# Patient Record
Sex: MALE | Race: BLACK OR AFRICAN AMERICAN | NOT HISPANIC OR LATINO | Employment: FULL TIME | URBAN - METROPOLITAN AREA
[De-identification: names, ages, dates, MRNs, and addresses within clinical notes are randomized per-mention and may not be internally consistent; named-entity substitution may affect disease eponyms.]

---

## 2022-11-14 ENCOUNTER — OFFICE VISIT (OUTPATIENT)
Dept: DERMATOLOGY | Facility: CLINIC | Age: 47
End: 2022-11-14
Payer: COMMERCIAL

## 2022-11-14 DIAGNOSIS — D48.5 NEOPLASM OF UNCERTAIN BEHAVIOR OF SKIN: Primary | ICD-10-CM

## 2022-11-14 PROCEDURE — 99203 OFFICE O/P NEW LOW 30 MIN: CPT | Mod: 25,S$GLB,, | Performed by: DERMATOLOGY

## 2022-11-14 PROCEDURE — 99203 PR OFFICE/OUTPT VISIT, NEW, LEVL III, 30-44 MIN: ICD-10-PCS | Mod: 25,S$GLB,, | Performed by: DERMATOLOGY

## 2022-11-14 PROCEDURE — 11102 PR TANGENTIAL BIOPSY, SKIN, SINGLE LESION: ICD-10-PCS | Mod: S$GLB,,, | Performed by: DERMATOLOGY

## 2022-11-14 PROCEDURE — 1159F MED LIST DOCD IN RCRD: CPT | Mod: CPTII,S$GLB,, | Performed by: DERMATOLOGY

## 2022-11-14 PROCEDURE — 99999 PR PBB SHADOW E&M-NEW PATIENT-LVL III: CPT | Mod: PBBFAC,,, | Performed by: DERMATOLOGY

## 2022-11-14 PROCEDURE — 11102 TANGNTL BX SKIN SINGLE LES: CPT | Mod: S$GLB,,, | Performed by: DERMATOLOGY

## 2022-11-14 PROCEDURE — 1160F RVW MEDS BY RX/DR IN RCRD: CPT | Mod: CPTII,S$GLB,, | Performed by: DERMATOLOGY

## 2022-11-14 PROCEDURE — 99999 PR PBB SHADOW E&M-NEW PATIENT-LVL III: ICD-10-PCS | Mod: PBBFAC,,, | Performed by: DERMATOLOGY

## 2022-11-14 PROCEDURE — 1160F PR REVIEW ALL MEDS BY PRESCRIBER/CLIN PHARMACIST DOCUMENTED: ICD-10-PCS | Mod: CPTII,S$GLB,, | Performed by: DERMATOLOGY

## 2022-11-14 PROCEDURE — 1159F PR MEDICATION LIST DOCUMENTED IN MEDICAL RECORD: ICD-10-PCS | Mod: CPTII,S$GLB,, | Performed by: DERMATOLOGY

## 2022-11-14 RX ORDER — METFORMIN HYDROCHLORIDE 500 MG/1
500 TABLET ORAL 2 TIMES DAILY WITH MEALS
COMMUNITY

## 2022-11-14 RX ORDER — LISINOPRIL 2.5 MG/1
2.5 TABLET ORAL DAILY
COMMUNITY

## 2022-11-14 NOTE — PROGRESS NOTES
Subjective:       Patient ID:  Franco Kapoor is a 46 y.o. male who presents for   Chief Complaint   Patient presents with    Warts     History of Present Illness: The patient presents with chief complaint of lesion.  Location: right 2nd digit  Duration: several years  Signs/Symptoms: growing    Prior treatments: n/a     Review of Systems   Constitutional:  Negative for fever and chills.   Gastrointestinal:  Negative for nausea and vomiting.   Skin:  Positive for activity-related sunscreen use. Negative for daily sunscreen use and recent sunburn.   Hematologic/Lymphatic: Does not bruise/bleed easily.      Objective:    Physical Exam   Constitutional: He appears well-developed and well-nourished. No distress.   Neurological: He is alert and oriented to person, place, and time. He is not disoriented.   Psychiatric: He has a normal mood and affect.   Skin:   Areas Examined (abnormalities noted in diagram):   Head / Face Inspection Performed  Neck Inspection Performed  RUE Inspected  LUE Inspection Performed  Nails and Digits Inspection Performed            Assessment / Plan:      Pathology Orders:       Normal Orders This Visit    Specimen to Pathology, Dermatology     Questions:    Procedure Type: Dermatology and skin neoplasms    Number of Specimens: 1    ------------------------: -------------------------    Spec 1 Procedure: Biopsy    Spec 1 Clinical Impression: slow growing tumor of the right 2nd digit, biopsy of larger growth/lesion performed    Spec 1 Source: right 2nd digit    Clinical Information: see above    Release to patient: Immediate          Neoplasm of uncertain behavior of skin  -     Ambulatory referral/consult to Orthopedics; Future; Expected date: 11/21/2022  -     Specimen to Pathology, Dermatology  -     tumor of the right 2nd digit, will refer to Dr. Peterson for further treatment.  Will perform shave biopsy today for potential diagnosis.   -     Shave biopsy(-ies) done of 1 site(s).   Patient  informed to call for results within 2 weeks if have not [facility]             Follow up for f/u with hand surgeon, Dr. Peterson.    PROCEDURE NOTE - SHAVE BIOPSY   Location: see above    After risk, benefits, and alternatives were discussed with the patient, the patient agrees to the procedure by verbal informed consent.  The area(s) were cleansed with alcohol. 2 cc of lidocaine 1% with epinephrine was injected for local anesthesia into each lesion(s).  A sharp dermablade was used to remove part or all of the lesion(s).  The specimen(s) will be sent for tissue pathology.  Hemostasis was obtained with aluminum chloride and/or hyfrecation.  The area(s) were dressed with vaseline ointment and bandaged.  The patient tolerated the procedure well without adverse events.  Wound care instructions were given to the patient on the AVS.  The patient will be notified of pathology results once available. Results will also be available in Epic.

## 2022-11-14 NOTE — PATIENT INSTRUCTIONS
Shave Biopsy Wound Care    Your doctor has performed a shave biopsy today.  A band aid and vaseline ointment has been placed over the site.  This should remain in place for 24 hours.  It is recommended that you keep the area dry for the first 24 hours.  After 24 hours, you may remove the band aid and wash the area with warm soap and water and apply Vaseline jelly.  Many patients prefer to use Neosporin or Bacitracin ointment.  This is acceptable; however, know that you can develop an allergy to this medication even if you have used it safely for years.  It is important to keep the area moist.  Letting it dry out and get air slows healing time, and will worsen the scar.  Band aid is optional after first 24 hours.      If you notice increasing redness, tenderness, pain, or yellow drainage at the biopsy site, please notify your doctor.  These are signs of an infection.    If your biopsy site is bleeding, apply firm pressure for 15 minutes straight.  Repeat for another 15 minutes, if it is still bleeding.   If the surgical site continues to bleed, then please contact your doctor.      [facility] - [facility]   DERMATOLOGY  [address]   [place] [address]   Dept: [phone]   Dept Fax: [phone]

## 2022-11-23 ENCOUNTER — OFFICE VISIT (OUTPATIENT)
Dept: ORTHOPEDICS | Facility: CLINIC | Age: 47
End: 2022-11-23
Payer: COMMERCIAL

## 2022-11-23 VITALS — BODY MASS INDEX: 35.55 KG/M2 | WEIGHT: 240 LBS | HEIGHT: 69 IN

## 2022-11-23 DIAGNOSIS — D48.5 NEOPLASM OF UNCERTAIN BEHAVIOR OF SKIN: ICD-10-CM

## 2022-11-23 DIAGNOSIS — D48.19 GIANT CELL TUMOR OF TENDON SHEATH: Primary | ICD-10-CM

## 2022-11-23 PROCEDURE — 4010F ACE/ARB THERAPY RXD/TAKEN: CPT | Mod: CPTII,S$GLB,,

## 2022-11-23 PROCEDURE — 99203 OFFICE O/P NEW LOW 30 MIN: CPT | Mod: S$GLB,,,

## 2022-11-23 PROCEDURE — 1159F PR MEDICATION LIST DOCUMENTED IN MEDICAL RECORD: ICD-10-PCS | Mod: CPTII,S$GLB,,

## 2022-11-23 PROCEDURE — 4010F PR ACE/ARB THEARPY RXD/TAKEN: ICD-10-PCS | Mod: CPTII,S$GLB,,

## 2022-11-23 PROCEDURE — 1159F MED LIST DOCD IN RCRD: CPT | Mod: CPTII,S$GLB,,

## 2022-11-23 PROCEDURE — 3008F PR BODY MASS INDEX (BMI) DOCUMENTED: ICD-10-PCS | Mod: CPTII,S$GLB,,

## 2022-11-23 PROCEDURE — 99999 PR PBB SHADOW E&M-EST. PATIENT-LVL III: ICD-10-PCS | Mod: PBBFAC,,,

## 2022-11-23 PROCEDURE — 99203 PR OFFICE/OUTPT VISIT, NEW, LEVL III, 30-44 MIN: ICD-10-PCS | Mod: S$GLB,,,

## 2022-11-23 PROCEDURE — 99999 PR PBB SHADOW E&M-EST. PATIENT-LVL III: CPT | Mod: PBBFAC,,,

## 2022-11-23 PROCEDURE — 3008F BODY MASS INDEX DOCD: CPT | Mod: CPTII,S$GLB,,

## 2022-11-23 NOTE — PROGRESS NOTES
"  SUBJECTIVE:      Chief Complaint: Pain of the Right Hand    Referring Provider: Yuli Lopez MD     History of Present Illness:  Patient is a 46 y.o. male who presents today with complaints of right index finger swelling.   Onset of symptoms was about 1 year ago. Denies injury or trauma to area.  The area is not painful.  He has not noticed any changes in size.  He is mostly concerned about the appearance of the finger.  He had a lesion biopsied on the same finger about a week ago by dermatology.  Denies discoloration, denies cold intolerance.  The patient denies any fevers, chills, N/V, D/C and presents for evaluation.    History reviewed. No pertinent past medical history.  History reviewed. No pertinent surgical history.  Review of patient's allergies indicates:  No Known Allergies  Social History     Social History Narrative    Not on file     History reviewed. No pertinent family history.      Current Outpatient Medications:     lisinopriL (PRINIVIL,ZESTRIL) 2.5 MG tablet, Take 2.5 mg by mouth once daily., Disp: , Rfl:     metFORMIN (GLUCOPHAGE) 500 MG tablet, Take 500 mg by mouth 2 (two) times daily with meals., Disp: , Rfl:       Review of Systems:  Constitutional: Negative for chills and fever.   Respiratory: Negative for cough and shortness of breath.    Gastrointestinal: Negative for nausea and vomiting.   Skin: Negative for rash.   Neurological: Negative for dizziness and headaches.   Psychiatric/Behavioral: Negative for depression.   MSK as in HPI     OBJECTIVE:      Vital Signs (Most Recent):  Vitals:    11/23/22 0747   Weight: 108.9 kg (240 lb)   Height: 5' 9" (1.753 m)     Body mass index is 35.44 kg/m².      Physical Exam:  Constitutional: The patient appears well-developed and well-nourished. No distress.   Skin: No lesions appreciated  Head: Normocephalic and atraumatic.   Nose: Nose normal.   Ears: No deformities seen  Eyes: Conjunctivae and EOM are normal.   Neck: No tracheal deviation " present.   Cardiovascular: Normal rate and intact distal pulses.    Pulmonary/Chest: Effort normal. No respiratory distress.   Abdominal: There is no guarding.   Neurological: The patient is alert.   Psychiatric: The patient has a normal mood and affect.                 Right Hand/Wrist Exam     Inspection   Scars: Wrist - absent Hand -  absent  Effusion: Wrist - absent Hand -  absent    Swelling   Hand - The patient is swollen on the index IP.    Other     Neuorologic Exam    Median Distribution: normal  Ulnar Distribution: normal  Radial Distribution: normal    Comments:  About 2cm subcutaneous soft tissue growth noted of the index finger at the mid and distal phalanx volar side, nontender to palpation  Scabbed area to distal phalanx of index finger where lesion was shaved off, healing appropriately  No sensory or motor deficits   No signs of infection   No discoloration      Left Hand/Wrist Exam     Inspection   Scars: Wrist - absent Hand -  absent  Effusion: Wrist - absent Hand -  absent    Other     Sensory Exam  Median Distribution: normal  Ulnar Distribution: normal  Radial Distribution: normal          Vascular Exam       Capillary Refill  Right Hand: normal capillary refill  Left Hand: normal capillary refill        Diagnostic Results:       No radiographs obtained today    ASSESSMENT/PLAN:        ICD-10-CM ICD-9-CM   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 Neoplasm of uncertain behavior of skin  D48.5 238.2        No orders of the defined types were placed in this encounter.      Plan:     -discussed diagnoses of giant cell tumor versus glomus tumor of finger, all questions answered  -x-ray of right hand ordered for follow-up appointment  -follow-up with Dr. Peterson for surgical evaluation next week    Should the patient's symptoms worsen, persist, or fail to improve they should return for reevaluation and I would be happy to see them back anytime.        Randi Seneca, PA-C [facility]     Please be aware that this note has been generated with the assistance of [employer] voice-to-text.  Please excuse any spelling or grammatical errors.

## 2022-11-30 ENCOUNTER — HOSPITAL ENCOUNTER (OUTPATIENT)
Dept: RADIOLOGY | Facility: HOSPITAL | Age: 47
Discharge: HOME | End: 2022-11-30
Payer: COMMERCIAL

## 2022-11-30 ENCOUNTER — OFFICE VISIT (OUTPATIENT)
Dept: ORTHOPEDICS | Facility: CLINIC | Age: 47
End: 2022-11-30
Payer: COMMERCIAL

## 2022-11-30 VITALS — HEIGHT: 69 IN | BODY MASS INDEX: 35.53 KG/M2 | WEIGHT: 239.88 LBS

## 2022-11-30 DIAGNOSIS — D48.19 GIANT CELL TUMOR OF TENDON SHEATH: ICD-10-CM

## 2022-11-30 DIAGNOSIS — D48.5 NEOPLASM OF UNCERTAIN BEHAVIOR OF SKIN: Primary | ICD-10-CM

## 2022-11-30 PROCEDURE — 99999 PR PBB SHADOW E&M-EST. PATIENT-LVL III: ICD-10-PCS | Mod: PBBFAC,,, | Performed by: ORTHOPAEDIC SURGERY

## 2022-11-30 PROCEDURE — 99999 PR PBB SHADOW E&M-EST. PATIENT-LVL III: CPT | Mod: PBBFAC,,, | Performed by: ORTHOPAEDIC SURGERY

## 2022-11-30 PROCEDURE — 99203 OFFICE O/P NEW LOW 30 MIN: CPT | Mod: S$GLB,,, | Performed by: ORTHOPAEDIC SURGERY

## 2022-11-30 PROCEDURE — 1160F PR REVIEW ALL MEDS BY PRESCRIBER/CLIN PHARMACIST DOCUMENTED: ICD-10-PCS | Mod: CPTII,S$GLB,, | Performed by: ORTHOPAEDIC SURGERY

## 2022-11-30 PROCEDURE — 4010F PR ACE/ARB THEARPY RXD/TAKEN: ICD-10-PCS | Mod: CPTII,S$GLB,, | Performed by: ORTHOPAEDIC SURGERY

## 2022-11-30 PROCEDURE — 1159F PR MEDICATION LIST DOCUMENTED IN MEDICAL RECORD: ICD-10-PCS | Mod: CPTII,S$GLB,, | Performed by: ORTHOPAEDIC SURGERY

## 2022-11-30 PROCEDURE — 73130 X-RAY EXAM OF HAND: CPT | Mod: 26,RT,, | Performed by: RADIOLOGY

## 2022-11-30 PROCEDURE — 1160F RVW MEDS BY RX/DR IN RCRD: CPT | Mod: CPTII,S$GLB,, | Performed by: ORTHOPAEDIC SURGERY

## 2022-11-30 PROCEDURE — 73130 XR HAND COMPLETE 3 VIEW RIGHT: ICD-10-PCS | Mod: 26,RT,, | Performed by: RADIOLOGY

## 2022-11-30 PROCEDURE — 3008F BODY MASS INDEX DOCD: CPT | Mod: CPTII,S$GLB,, | Performed by: ORTHOPAEDIC SURGERY

## 2022-11-30 PROCEDURE — 73130 X-RAY EXAM OF HAND: CPT | Mod: TC,RT

## 2022-11-30 PROCEDURE — 99203 PR OFFICE/OUTPT VISIT, NEW, LEVL III, 30-44 MIN: ICD-10-PCS | Mod: S$GLB,,, | Performed by: ORTHOPAEDIC SURGERY

## 2022-11-30 PROCEDURE — 3008F PR BODY MASS INDEX (BMI) DOCUMENTED: ICD-10-PCS | Mod: CPTII,S$GLB,, | Performed by: ORTHOPAEDIC SURGERY

## 2022-11-30 PROCEDURE — 4010F ACE/ARB THERAPY RXD/TAKEN: CPT | Mod: CPTII,S$GLB,, | Performed by: ORTHOPAEDIC SURGERY

## 2022-11-30 PROCEDURE — 1159F MED LIST DOCD IN RCRD: CPT | Mod: CPTII,S$GLB,, | Performed by: ORTHOPAEDIC SURGERY

## 2022-11-30 NOTE — PROGRESS NOTES
Subjective:     Patient ID: Franco Kapoor is a 46 y.o. male.    Chief Complaint: Swelling of the Right Hand      HPI:  The patient is a 46-year-old male with soft tissue mass of the volar pulp and volar middle phalanx right index finger present for many years.  He apparently had a skin biopsy by Dermatology.  He is referred from Dermatology for excision of this mass.  It is about 4 cm in vertical diameter.  Past Medical History:   Diagnosis Date    Pre-diabetes      History reviewed. No pertinent surgical history.  History reviewed. No pertinent family history.  Social History     Socioeconomic History    Marital status:    Tobacco Use    Smoking status: Never    Smokeless tobacco: Never   Substance and Sexual Activity    Alcohol use: Yes     Comment: socially    Drug use: Never    Sexual activity: Not Currently     Medication List with Changes/Refills   Current Medications    LISINOPRIL (PRINIVIL,ZESTRIL) 2.5 MG TABLET    Take 2.5 mg by mouth once daily.    METFORMIN (GLUCOPHAGE) 500 MG TABLET    Take 500 mg by mouth 2 (two) times daily with meals.     Review of patient's allergies indicates:  No Known Allergies  Review of Systems   Constitutional: Negative for malaise/fatigue.   HENT:  Negative for hearing loss.    Eyes:  Negative for double vision and visual disturbance.   Cardiovascular:  Negative for chest pain.   Respiratory:  Negative for shortness of breath.    Endocrine: Negative for cold intolerance.   Hematologic/Lymphatic: Does not bruise/bleed easily.   Skin:  Negative for poor wound healing and suspicious lesions.   Musculoskeletal:  Negative for gout, joint pain and joint swelling.   Gastrointestinal:  Negative for nausea and vomiting.   Genitourinary:  Negative for dysuria.   Neurological:  Negative for numbness, paresthesias and sensory change.   Psychiatric/Behavioral:  Negative for depression, memory loss and substance abuse. The patient is not nervous/anxious.    Allergic/Immunologic:  Negative for persistent infections.     Objective:   Body mass index is 35.42 kg/m².  There were no vitals filed for this visit.             General    Constitutional: He is oriented to person, place, and time. He appears well-developed and well-nourished. No distress.   HENT:   Head: Normocephalic.   Mouth/Throat: Oropharynx is clear and moist.   Eyes: EOM are normal.   Cardiovascular:  Normal rate.            Pulmonary/Chest: Effort normal.   Abdominal: Soft.   Neurological: He is alert and oriented to person, place, and time. No cranial nerve deficit.   Psychiatric: He has a normal mood and affect.             Right Hand/Wrist Exam     Inspection   Scars: Wrist - absent Hand -  absent  Effusion: Wrist - absent Hand -  absent    Other     Neuorologic Exam    Median Distribution: normal  Ulnar Distribution: normal  Radial Distribution: normal    Comments:  The patient is a 4 cm mass soft tissue pulp right index finger and volar middle phalanx.  There is a 5 mm defect in the skin from the dermatology biopsy.  The path report is not back yet          Vascular Exam       Capillary Refill  Right Hand: normal capillary refill        Relevant imaging results reviewed and interpreted by me, discussed with the patient and / or family today radiographs right index finger normal other than soft tissue mass  Assessment:     Encounter Diagnosis   Name Primary?    Neoplasm of uncertain behavior of skin Yes        Plan:     The patient was counseled regarding excision of this right index finger mass.  Risk complications and alternatives were discussed including the risk of infection, anesthetic risk, injury to nerves and vessels, loss of motion, and possible need for additional surgeries were discussed.  He seems to understand and agree that surgery.  All questions were answered.                Disclaimer: This note was prepared using a voice recognition system and is likely to have sound alike errors within the text.

## 2022-12-01 DIAGNOSIS — D48.19 GIANT CELL TUMOR OF TENDON SHEATH: Primary | ICD-10-CM

## 2022-12-01 DIAGNOSIS — Z01.818 PREOP TESTING: ICD-10-CM

## 2022-12-01 DIAGNOSIS — D48.5 NEOPLASM OF UNCERTAIN BEHAVIOR OF SKIN: ICD-10-CM

## 2022-12-14 LAB
COMMENT: NORMAL
FINAL PATHOLOGIC DIAGNOSIS: NORMAL
GROSS: NORMAL
Lab: NORMAL
MICROSCOPIC EXAM: NORMAL
SUPPLEMENTAL DIAGNOSIS: NORMAL

## 2023-01-09 ENCOUNTER — TELEPHONE (OUTPATIENT)
Dept: ORTHOPEDICS | Facility: CLINIC | Age: 48
End: 2023-01-09
Payer: COMMERCIAL

## 2023-01-09 PROBLEM — D48.9 NEOPLASM OF UNCERTAIN BEHAVIOR: Status: ACTIVE | Noted: 2023-01-09

## 2023-01-09 NOTE — TELEPHONE ENCOUNTER
Spoke to the patient an we rescheduled his surgery to march 13 an also moved his 2 post op appointments an called the OR an spoke to Nanci jay she pulled from the depot an put the surgery back on for march 13th.         ----- Message from Nieves Oneill NP sent at 1/9/2023  2:37 PM CST -----  Regarding: Patient wishes to postpone surgery until March  Good afternoon,    I just spoke with Mr. Kapoor as he did not come for his PAT appointment today.  He is requesting to please reschedule his surgery with Dr. Peterson for March.  I will place his current case request in the depot, if you can please reach out to the patient to reschedule at his convenience :)    Sincerely,    Electronically signed by Nieves Oneill DNP, ACNP on 1/9/2023 at 2:38 PM.

## 2023-03-08 ENCOUNTER — HOSPITAL ENCOUNTER (OUTPATIENT)
Dept: PREADMISSION TESTING | Facility: HOSPITAL | Age: 48
Discharge: HOME | End: 2023-03-08
Attending: ORTHOPAEDIC SURGERY
Payer: COMMERCIAL

## 2023-03-08 VITALS
HEART RATE: 89 BPM | WEIGHT: 239 LBS | BODY MASS INDEX: 35.4 KG/M2 | RESPIRATION RATE: 14 BRPM | OXYGEN SATURATION: 97 % | DIASTOLIC BLOOD PRESSURE: 67 MMHG | TEMPERATURE: 98 F | HEIGHT: 69 IN | SYSTOLIC BLOOD PRESSURE: 131 MMHG

## 2023-03-08 DIAGNOSIS — D48.9 NEOPLASM OF UNCERTAIN BEHAVIOR: ICD-10-CM

## 2023-03-08 DIAGNOSIS — E66.09 CLASS 2 OBESITY DUE TO EXCESS CALORIES WITHOUT SERIOUS COMORBIDITY WITH BODY MASS INDEX (BMI) OF 35.0 TO 35.9 IN ADULT: ICD-10-CM

## 2023-03-08 DIAGNOSIS — I10 HYPERTENSION, UNSPECIFIED TYPE: ICD-10-CM

## 2023-03-08 DIAGNOSIS — R73.03 PREDIABETES: ICD-10-CM

## 2023-03-08 DIAGNOSIS — Z01.818 PREOP EXAMINATION: Primary | ICD-10-CM

## 2023-03-08 LAB
ALBUMIN SERPL BCP-MCNC: 4.1 G/DL (ref 3.5–5.2)
ALP SERPL-CCNC: 49 U/L (ref 55–135)
ALT SERPL W/O P-5'-P-CCNC: 15 U/L (ref 10–44)
ANION GAP SERPL CALC-SCNC: 11 MMOL/L (ref 8–16)
AST SERPL-CCNC: 14 U/L (ref 10–40)
BASOPHILS # BLD AUTO: 0.04 K/UL (ref 0–0.2)
BASOPHILS NFR BLD: 0.7 % (ref 0–1.9)
BILIRUB SERPL-MCNC: 0.9 MG/DL (ref 0.1–1)
BUN SERPL-MCNC: 12 MG/DL (ref 6–20)
CALCIUM SERPL-MCNC: 9.4 MG/DL (ref 8.7–10.5)
CHLORIDE SERPL-SCNC: 105 MMOL/L (ref 95–110)
CO2 SERPL-SCNC: 21 MMOL/L (ref 23–29)
CREAT SERPL-MCNC: 1 MG/DL (ref 0.5–1.4)
DIFFERENTIAL METHOD: ABNORMAL
EOSINOPHIL # BLD AUTO: 0.2 K/UL (ref 0–0.5)
EOSINOPHIL NFR BLD: 3.2 % (ref 0–8)
ERYTHROCYTE [DISTWIDTH] IN BLOOD BY AUTOMATED COUNT: 19 % (ref 11.5–14.5)
EST. GFR  (NO RACE VARIABLE): >60 ML/MIN/1.73 M^2
GLUCOSE SERPL-MCNC: 155 MG/DL (ref 70–110)
HCT VFR BLD AUTO: 30.7 % (ref 40–54)
HGB BLD-MCNC: 9.1 G/DL (ref 14–18)
IMM GRANULOCYTES # BLD AUTO: 0.01 K/UL (ref 0–0.04)
IMM GRANULOCYTES NFR BLD AUTO: 0.2 % (ref 0–0.5)
LYMPHOCYTES # BLD AUTO: 1.6 K/UL (ref 1–4.8)
LYMPHOCYTES NFR BLD: 26.6 % (ref 18–48)
MCH RBC QN AUTO: 21.7 PG (ref 27–31)
MCHC RBC AUTO-ENTMCNC: 29.6 G/DL (ref 32–36)
MCV RBC AUTO: 73 FL (ref 82–98)
MONOCYTES # BLD AUTO: 0.3 K/UL (ref 0.3–1)
MONOCYTES NFR BLD: 4.7 % (ref 4–15)
NEUTROPHILS # BLD AUTO: 3.8 K/UL (ref 1.8–7.7)
NEUTROPHILS NFR BLD: 64.6 % (ref 38–73)
NRBC BLD-RTO: 0 /100 WBC
PLATELET # BLD AUTO: 336 K/UL (ref 150–450)
PMV BLD AUTO: 10.2 FL (ref 9.2–12.9)
POTASSIUM SERPL-SCNC: 3.8 MMOL/L (ref 3.5–5.1)
PROT SERPL-MCNC: 8 G/DL (ref 6–8.4)
RBC # BLD AUTO: 4.19 M/UL (ref 4.6–6.2)
SODIUM SERPL-SCNC: 137 MMOL/L (ref 136–145)
WBC # BLD AUTO: 5.91 K/UL (ref 3.9–12.7)

## 2023-03-08 PROCEDURE — 93010 EKG 12-LEAD: ICD-10-PCS | Mod: ,,, | Performed by: INTERNAL MEDICINE

## 2023-03-08 PROCEDURE — 93010 ELECTROCARDIOGRAM REPORT: CPT | Mod: ,,, | Performed by: INTERNAL MEDICINE

## 2023-03-08 PROCEDURE — 93005 ELECTROCARDIOGRAM TRACING: CPT

## 2023-03-08 PROCEDURE — 85025 COMPLETE CBC W/AUTO DIFF WBC: CPT | Performed by: NURSE PRACTITIONER

## 2023-03-08 PROCEDURE — 80053 COMPREHEN METABOLIC PANEL: CPT | Performed by: NURSE PRACTITIONER

## 2023-03-08 RX ORDER — SIMVASTATIN 10 MG/1
10 TABLET, FILM COATED ORAL NIGHTLY
COMMUNITY

## 2023-03-08 NOTE — H&P
Preoperative History and Physical  [facility]                                                                   Chief Complaint: Preoperative evaluation     History of Present Illness:      Franco Kapoor is a 47 y.o. male with a PMHx of Arthritis, HTN, Prediabetes, and Obesity who presents to the office today for a preoperative consultation at the request of Dr. Peterson who plans on performing Finger mass excision on March 13.     Functional Status:      The patient is able to climb a flight of stairs. The patient is able to ambulate without difficulty. The patient's functional status is not affected by the surgical problem. The patient's functional status is not affected by shortness of breath, chest pain, dyspnea on exertion and fatigue.      MET score greater than 4    Past Medical History:      Past Medical History:   Diagnosis Date    Arthritis     Hypertension     Pre-diabetes         Past Surgical History:      Past Surgical History:   Procedure Laterality Date    COLONOSCOPY          Social History:      Social History     Socioeconomic History    Marital status:    Tobacco Use    Smoking status: Never    Smokeless tobacco: Never   Substance and Sexual Activity    Alcohol use: Yes     Comment: socially    Drug use: Never    Sexual activity: Not Currently        Family History:      Family History   Problem Relation Age of Onset    Diabetes Mother     Hypertension Mother     No Known Problems Father        Allergies:      Review of patient's allergies indicates:  No Known Allergies    Medications:      Current Outpatient Medications   Medication Sig    lisinopriL (PRINIVIL,ZESTRIL) 2.5 MG tablet Take 2.5 mg by mouth once daily.    metFORMIN (GLUCOPHAGE) 500 MG tablet Take 500 mg by mouth 2 (two) times daily with meals.    simvastatin (ZOCOR) 10 MG tablet Take 10 mg by mouth every evening.    semaglutide (OZEMPIC SUBQ) Inject into the skin once a  week.     No current facility-administered medications for this encounter.       Vitals:      Vitals:    03/08/23 1357   BP: 131/67   Pulse: 89   Resp: 14   Temp: 97.5 °F (36.4 °C)       Review of Systems:        Constitutional: Negative for fever, chills, weight loss, malaise/fatigue and diaphoresis.   HENT: Negative for hearing loss, ear pain, nosebleeds, congestion, sore throat, neck pain, tinnitus and ear discharge.    Eyes: Negative for blurred vision, double vision, photophobia, pain, discharge and redness.   Respiratory: Negative for cough, hemoptysis, sputum production, shortness of breath, wheezing and stridor.    Cardiovascular: Negative for chest pain, palpitations, orthopnea, claudication, leg swelling and PND.   Gastrointestinal: Negative for heartburn, nausea, vomiting, abdominal pain, diarrhea, constipation, blood in stool and melena.   Genitourinary: Negative for dysuria, urgency, frequency, hematuria and flank pain.   Musculoskeletal: Negative for myalgias, back pain, joint pain and falls. Positive for right finger cyst.  Skin: Negative for itching and rash.   Neurological: Negative for dizziness, tingling, tremors, sensory change, speech change, focal weakness, seizures, loss of consciousness, weakness and headaches.   Endo/Heme/Allergies: Negative for environmental allergies and polydipsia. Does not bruise/bleed easily.   Psychiatric/Behavioral: Negative for depression, suicidal ideas, hallucinations, memory loss and substance abuse. The patient is not nervous/anxious and does not have insomnia.    All 14 systems reviewed and negative except as noted above.    Physical Exam:      Constitutional: Appears well-developed, well-nourished and in no acute distress.  Patient is oriented to person, place, and time.   Head: Normocephalic and atraumatic. Mucous membranes moist.  Neck: Neck supple no mass.   Cardiovascular: Normal rate and regular rhythm.  S1 S2 appreciated by ascultation.  Pulmonary/Chest:  Effort normal and clear to auscultation bilaterally. No respiratory distress.   Abdomen: Soft. Non-tender and non-distended. Bowel sounds are normal.   Neurological: Patient is alert and oriented to person, place and time. Moves all extremities.  Skin: Warm and dry. No lesions.  Extremities: No clubbing, cyanosis or edema.    Laboratory data:      Reviewed and noted in plan where applicable. Please see chart for full laboratory data.    No results for input(s): CPK, CPKMB, TROPONINI, MB in the last 24 hours. No results for input(s): POCTGLUCOSE in the last 24 hours.     No results found for: INR, PROTIME    Lab Results   Component Value Date    WBC 8.17 01/03/2006    HGB 13.7 (L) 01/03/2006    HCT 42.3 01/03/2006    MCV 86.5 01/03/2006     01/03/2006       No results for input(s): GLU, NA, K, CL, CO2, BUN, CREATININE, CALCIUM, MG in the last 24 hours.    Predictors of intubation difficulty:       Morbid obesity? yes    Anatomically abnormal facies? no   Prominent incisors? no   Receding mandible? no   Short, thick neck? yes    Neck range of motion: normal   Dentition: No chipped, loose, or missing teeth.  Based on the Modified Mallampati, patient is a mallampati score: I (soft palate, uvula, fauces, and tonsillar pillars visible)    Cardiographics:      ECG: normal sinus rhythm, no blocks or conduction defects, no ischemic changes    Echocardiogram: not indicated    Imaging:      Chest x-ray: not indicated    Assessment and Plan:      Neoplasm of uncertain behavior  - Patient presents today at the request of Dr. Peterson who plans on performing a finger mass excision on 3/13.    Known risk factors for perioperative complications: None    Difficulty with intubation is not anticipated.    Cardiac Risk Estimation: Based on the Revised Cardiac Risk index, patient is a Class 1 risk with a 3.9% risk of a major cardiac event in a low risk procedure.    1.) Preoperative workup as follows: ECG, hemoglobin, hematocrit,  electrolytes, creatinine, glucose, liver function studies.  2.) Change in medication regimen before surgery: discontinue ASA 6 days before surgery, discontinue NSAIDs 5 days before surgery, hold Metformin 24 hours prior to surgery.  3.) Prophylaxis for cardiac events with perioperative beta-blockers: not indicated.  4.) Invasive hemodynamic monitoring perioperatively: not indicated.  5.) Deep vein thrombosis prophylaxis postoperatively: intermittent pneumatic compression boots and regimen to be chosen by surgical team.  6.) Surveillance for postoperative MI with ECG immediately postoperatively and on postoperati ve days 1 and 2 AND troponin levels 24 hours postoperatively and on day 4 or hospital discharge (whichever comes first): not indicated.  7.) Current medications which may produce withdrawal symptoms if withheld perioperatively: None.  8.) Other measures: none.    HTN (hypertension)  - BP well controlled.  - Continue Lisinopril for now with instructions to hold the morning of surgery to avoid hypotension associated with anesthesia, and resume postoperatively as directed.    Prediabetes  - A1c 6.5 on 2/11.  - Continue Metformin for now with instructions to hold for 24 hours prior to surgery, and resume postoperatively as directed.    Class 2 obesity due to excess calories in adult  - Self directed diet and weight loss.        Electronically signed by Nieves Oneill DNP, ACNP on 3/8/2023 at 2:02 PM.

## 2023-03-08 NOTE — PROGRESS NOTES
To confirm, your doctor has instructed you that surgery is scheduled for 3.13.2023.       Pre admit office will call the afternoon prior to surgery between 1PM and 3PM with arrival time.    Surgery will be at [facility],  The address is [address]. [place]  [address].      IMPORTANT INSTRUCTIONS!    Do not eat or drink after 12 midnight, including water.   Do not smoke or use chewing tobacco after 12 midnight  OK to brush teeth, but no gum, candy, or mints!      Take only these medicines with a small swallow of water-morning of surgery.     none         ____ Stop Aspirin, Ibuprofen, Motrin and Aleve at least 5-7 days before surgery, unless otherwise instructed by your doctor, or the nurse.   You MAY use Tylenol/acetaminophen until day of surgery.      ____  If you take diabetic medication, do NOT take morning of surgery unless instructed by Doctor. Metformin must be stopped 24 hrs prior to surgery time.       ____ Stop taking any Fish Oil supplements or Vitamins at least 5 days prior to surgery, unless instructed otherwise by your Doctor.       Please notify MD office if you have an active infection, currently taking antibiotics or received a vaccination within the past 7 days.      Bathing Instructions: The night before surgery and the morning prior to coming to the hospital:    - Shower & rinse your body as usual with anti-bacterial Soap (Dial or Kraig 2000)   -Hibiclens (if indicated) use AFTER anti-bacterial soap; 1 packet PM/1 packet in AM on surgical site only   -Do not use hibiclens on your head, face, or genitals.    -Do not wash with anti-bacterial soap after you use the hibiclens.    -Do not shave surgical site 5-7 days prior to surgery.    -Pubic hair 7 days prior to surgery (gyn pt's).      Pediatric patients do not need to use anti-bacterial soap or Hibiclens.             After Bathing:   __ No powder, lotions, creams, or body spray to skin     __No deodorant for any breast  procedure, PORT, or upper arm surgery     __ No makeup, mascara, nail polish or artificial nails        **SURGERY WILL BE CANCELLED IF ARTIFICIAL/NAIL POLISH IS PRESENT!!!**    __ Please remove all piercings and leave all jewelry at home.    **SURGERY WILL BE CANCELLED IF PIERCINGS ARE PRESENT!!!**      __ Dentures, Hearing Aids and Contact Lens need to be removed prior to the start of surgery.      __ Wear clean, loose-fitting clothing. Allow for dressings/bandages/surgical equipment     __ You must have transportation, and they MUST stay the entire time.       [facility] Visitor/Ride Policy:   Only 1 adult allowed (over the age of 18) to accompany you into Pre-op/Recovery Surgery Dept and must stay through the entire length of admission.     Must have a ride home from a responsible adult that you know and trust.      Pediatric Patients are allowed 2 adult visitors.     Medical Transport, Uber or Lyft can only be used if patient has a responsible adult to accompany them during ride home.         Post-Op Instructions: You will receive surgery post-op instructions by your Discharge Nurse prior to going home.     Surgical Site Infection:   Prevention of surgical site infections:   -Keep incisions clean and dry.   -Do not soak/submerge incisions in water until completely healed.   -Do not apply lotions, powders, creams, or deodorants to site.   -Always make sure hands are cleaned with antibacterial soap/ alcohol-based   prior to touching the surgical site.       Signs and symptoms:               -Redness and pain around the area where you had surgery               -Drainage of cloudy fluid from your surgical wound               -Fever over 100.4 or chills     >>>Call Surgeon office/on-call Surgeon if you experience any of these signs & symptoms post-surgery @ [phone].      *Please Call [facility] Pre-Admit Department for surgery instruction questions:  [phone]    *Payment  questions:  [phone]    *Billing questions:  [phone]

## 2023-03-08 NOTE — ASSESSMENT & PLAN NOTE
- Patient presents today at the request of Dr. Peterson who plans on performing a finger mass excision on 3/13.    Known risk factors for perioperative complications: None    Difficulty with intubation is not anticipated.    Cardiac Risk Estimation: Based on the Revised Cardiac Risk index, patient is a Class 1 risk with a 3.9% risk of a major cardiac event in a low risk procedure.    1.) Preoperative workup as follows: ECG, hemoglobin, hematocrit, electrolytes, creatinine, glucose, liver function studies.  2.) Change in medication regimen before surgery: discontinue ASA 6 days before surgery, discontinue NSAIDs 5 days before surgery, hold Metformin 24 hours prior to surgery.  3.) Prophylaxis for cardiac events with perioperative beta-blockers: not indicated.  4.) Invasive hemodynamic monitoring perioperatively: not indicated.  5.) Deep vein thrombosis prophylaxis postoperatively: intermittent pneumatic compression boots and regimen to be chosen by surgical team.  6.) Surveillance for postoperative MI with ECG immediately postoperatively and on postoperati ve days 1 and 2 AND troponin levels 24 hours postoperatively and on day 4 or hospital discharge (whichever comes first): not indicated.  7.) Current medications which may produce withdrawal symptoms if withheld perioperatively: None.  8.) Other measures: none.

## 2023-03-08 NOTE — DISCHARGE INSTRUCTIONS
To confirm, your doctor has instructed you that surgery is scheduled for 3.13.2023.       Pre admit office will call the afternoon prior to surgery between 1PM and 3PM with arrival time.    Surgery will be at [facility],  The address is [address]. [place]  [address].      IMPORTANT INSTRUCTIONS!    Do not eat or drink after 12 midnight, including water.   Do not smoke or use chewing tobacco after 12 midnight  OK to brush teeth, but no gum, candy, or mints!      Take only these medicines with a small swallow of water-morning of surgery.     none         ____ Stop Aspirin, Ibuprofen, Motrin and Aleve at least 5-7 days before surgery, unless otherwise instructed by your doctor, or the nurse.   You MAY use Tylenol/acetaminophen until day of surgery.      ____  If you take diabetic medication, do NOT take morning of surgery unless instructed by Doctor. Metformin must be stopped 24 hrs prior to surgery time.       ____ Stop taking any Fish Oil supplements or Vitamins at least 5 days prior to surgery, unless instructed otherwise by your Doctor.       Please notify MD office if you have an active infection, currently taking antibiotics or received a vaccination within the past 7 days.      Bathing Instructions: The night before surgery and the morning prior to coming to the hospital:    - Shower & rinse your body as usual with anti-bacterial Soap (Dial or Kraig 2000)   -Hibiclens (if indicated) use AFTER anti-bacterial soap; 1 packet PM/1 packet in AM on surgical site only   -Do not use hibiclens on your head, face, or genitals.    -Do not wash with anti-bacterial soap after you use the hibiclens.    -Do not shave surgical site 5-7 days prior to surgery.    -Pubic hair 7 days prior to surgery (gyn pt's).      Pediatric patients do not need to use anti-bacterial soap or Hibiclens.             After Bathing:   __ No powder, lotions, creams, or body spray to skin     __No deodorant for any breast  procedure, PORT, or upper arm surgery     __ No makeup, mascara, nail polish or artificial nails        **SURGERY WILL BE CANCELLED IF ARTIFICIAL/NAIL POLISH IS PRESENT!!!**    __ Please remove all piercings and leave all jewelry at home.    **SURGERY WILL BE CANCELLED IF PIERCINGS ARE PRESENT!!!**      __ Dentures, Hearing Aids and Contact Lens need to be removed prior to the start of surgery.      __ Wear clean, loose-fitting clothing. Allow for dressings/bandages/surgical equipment     __ You must have transportation, and they MUST stay the entire time.       [facility] Visitor/Ride Policy:   Only 1 adult allowed (over the age of 18) to accompany you into Pre-op/Recovery Surgery Dept and must stay through the entire length of admission.     Must have a ride home from a responsible adult that you know and trust.      Pediatric Patients are allowed 2 adult visitors.     Medical Transport, Uber or Lyft can only be used if patient has a responsible adult to accompany them during ride home.         Post-Op Instructions: You will receive surgery post-op instructions by your Discharge Nurse prior to going home.     Surgical Site Infection:   Prevention of surgical site infections:   -Keep incisions clean and dry.   -Do not soak/submerge incisions in water until completely healed.   -Do not apply lotions, powders, creams, or deodorants to site.   -Always make sure hands are cleaned with antibacterial soap/ alcohol-based   prior to touching the surgical site.       Signs and symptoms:               -Redness and pain around the area where you had surgery               -Drainage of cloudy fluid from your surgical wound               -Fever over 100.4 or chills     >>>Call Surgeon office/on-call Surgeon if you experience any of these signs & symptoms post-surgery @ [phone].      *Please Call [facility] Pre-Admit Department for surgery instruction questions:  [phone]    *Payment  questions:  [phone]    *Billing questions:  [phone]

## 2023-03-08 NOTE — ASSESSMENT & PLAN NOTE
- BP well controlled.  - Continue Lisinopril for now with instructions to hold the morning of surgery to avoid hypotension associated with anesthesia, and resume postoperatively as directed.

## 2023-03-08 NOTE — ASSESSMENT & PLAN NOTE
- A1c 6.5 on 2/11.  - Continue Metformin for now with instructions to hold for 24 hours prior to surgery, and resume postoperatively as directed.

## 2023-03-10 ENCOUNTER — TELEPHONE (OUTPATIENT)
Dept: ORTHOPEDICS | Facility: CLINIC | Age: 48
End: 2023-03-10
Payer: COMMERCIAL

## 2023-03-10 ENCOUNTER — TELEPHONE (OUTPATIENT)
Dept: PREADMISSION TESTING | Facility: HOSPITAL | Age: 48
End: 2023-03-10
Payer: COMMERCIAL

## 2023-03-10 NOTE — TELEPHONE ENCOUNTER
Called and spoke with the patient about the following:     Your Surgery arrival time is at 0530 on 3.13.2023 at [facility] location.   The address is [address] [place]  [address].      Only one adult (over 18) is to accompany you to surgery, unless it is a Pediatric patient, then 2 adults are encouraged to accompany them to the surgery center.     Your ride MUST STAY the entire time until you are discharged.      Please come to the main lobby and be prepared to show your photo ID and insurance card.      Nothing to eat or drink after midnight, unless you were instructed to take specific medications discussed with the Pre-admit Nurse.      Please call with any questions or concerns.     [phone]      Thanks.

## 2023-03-10 NOTE — TELEPHONE ENCOUNTER
Attempted to call the patient several times there is no answer.     ----- Message from Vivek Cuenca sent at 3/10/2023 11:07 AM CST -----  Regarding: contact patient  Good Morning       Would someone please reach out to patient to reschedule.      ANDRES Cuenca

## 2023-03-20 ENCOUNTER — DOCUMENTATION ONLY (OUTPATIENT)
Dept: PREADMISSION TESTING | Facility: HOSPITAL | Age: 48
End: 2023-03-20
Payer: COMMERCIAL

## 2023-03-20 NOTE — PROGRESS NOTES
Routine pre-op labs showed Hgb of 9.1.  Per review of results in Care Everywhere, patient has been anemic with Hgb ranging from 8.8-11.4.  Patient denies any and all signs of bleeding, and reports he is being closely followed by his PCP Dr. Brooklyn Yancey.  He reports he underwent a colonoscopy approximately 4 years ago that was reportedly normal.  He reports that he is now following with GI with plans to repeat colonoscopy in the near future to investigate further.  Patient was encouraged to keep close outpatient f/u, verbalized + understanding.

## 2023-04-14 ENCOUNTER — TELEPHONE (OUTPATIENT)
Dept: PREADMISSION TESTING | Facility: HOSPITAL | Age: 48
End: 2023-04-14
Payer: COMMERCIAL

## 2023-10-24 ENCOUNTER — TELEPHONE (OUTPATIENT)
Dept: SLEEP MEDICINE | Facility: CLINIC | Age: 48
End: 2023-10-24
Payer: COMMERCIAL

## 2023-10-24 ENCOUNTER — OFFICE VISIT (OUTPATIENT)
Dept: SLEEP MEDICINE | Facility: CLINIC | Age: 48
End: 2023-10-24
Payer: COMMERCIAL

## 2023-10-24 DIAGNOSIS — G47.30 SLEEP APNEA, UNSPECIFIED TYPE: Primary | ICD-10-CM

## 2023-10-24 PROCEDURE — 4010F PR ACE/ARB THEARPY RXD/TAKEN: ICD-10-PCS | Mod: CPTII,95,, | Performed by: NURSE PRACTITIONER

## 2023-10-24 PROCEDURE — 99203 OFFICE O/P NEW LOW 30 MIN: CPT | Mod: 25,95,, | Performed by: NURSE PRACTITIONER

## 2023-10-24 PROCEDURE — 99203 PR OFFICE/OUTPT VISIT, NEW, LEVL III, 30-44 MIN: ICD-10-PCS | Mod: 25,95,, | Performed by: NURSE PRACTITIONER

## 2023-10-24 PROCEDURE — 4010F ACE/ARB THERAPY RXD/TAKEN: CPT | Mod: CPTII,95,, | Performed by: NURSE PRACTITIONER

## 2023-10-24 NOTE — PROGRESS NOTES
The patient location is: [place]  The chief complaint leading to consultation is: sleep evaluation    Visit type: audiovisual    Face to Face time with patient:5minutes of total time spent on the encounter, which includes face to face time and non-face to face time preparing to see the patient (eg, review of tests), Obtaining and/or reviewing separately obtained history, Documenting clinical information in the electronic or other health record, Independently interpreting results (not separately reported) and communicating results to the patient/family/caregiver, or Care coordination (not separately reported). Each patient to whom he or she provides medical services by telemedicine is:  (1) informed of the relationship between the physician and patient and the respective role of any other health care provider with respect to management of the patient; and (2) notified that he or she may decline to receive medical services by telemedicine and may withdraw from such care at any time.    Referred by Dr. Yancey    HISTORY OF PRESENT ILLNESS: He has never had a sleep study. Had consultation at NO sleep center but never called to schedule his study. Snoring loudly but denies witnessed apneic pauses. Tired in am. Denies am headaches  +sinus/allergies/weather changes. Wife using cpap.   preDM stable ~ 6.2  FAMILY HISTORY: No known sleep disorders.   SOCIAL HISTORY:   GEORGETTE GIBSON      ASSESSMENT:   Unspecified Sleep Apnea, with symptoms of disruptive snoring, un-refreshing sleep and daytime sleepiness,  with medical comorbidities of preDM  hypertension. Warrants further investigation for untreated sleep apnea.     PLAN:   1.Home Sleep Study, discussed plan of care (if + plan apap/nose with close monitoring)

## 2023-10-24 NOTE — TELEPHONE ENCOUNTER
----- Message from Rosario Luna sent at 10/24/2023  7:38 AM CDT -----  Regarding: can appt be changed to virtual  Contact:  [phone]  The patient called requesting to change appt today ay 3pm to a virtual  Please call to notify    No further information provided      Patient can be contacted @# [phone]

## 2023-10-27 ENCOUNTER — TELEPHONE (OUTPATIENT)
Dept: SLEEP MEDICINE | Facility: OTHER | Age: 48
End: 2023-10-27
Payer: COMMERCIAL

## 2023-11-14 ENCOUNTER — HOSPITAL ENCOUNTER (OUTPATIENT)
Dept: SLEEP MEDICINE | Facility: HOSPITAL | Age: 48
Discharge: HOME | End: 2023-11-14
Attending: FAMILY MEDICINE
Payer: COMMERCIAL

## 2023-11-14 DIAGNOSIS — G47.33 OSA (OBSTRUCTIVE SLEEP APNEA): Primary | ICD-10-CM

## 2023-11-14 DIAGNOSIS — G47.30 SLEEP APNEA, UNSPECIFIED TYPE: ICD-10-CM

## 2023-11-14 PROCEDURE — 95800 SLP STDY UNATTENDED: CPT

## 2023-11-14 NOTE — PROGRESS NOTES
Per physician orders, patient was given home sleep testing device and instructed on how to apply the device before going to bed tonight. I sized the device and showed the patient using a mirror how the device fits and what it should look like so they can use a mirror when putting it on themselves at home. We reviewed the instruction booklet and the number to call if they have any questions at night. Patient verbalized understanding of the instructions and teach back complete. Patient was instructed to return the device the next day before 9am.

## 2023-11-25 PROCEDURE — 95806 PR SLEEP STUDY, UNATTENDED, SIMUL RECORD HR/O2 SAT/RESP FLOW/RESP EFFT: ICD-10-PCS | Mod: 26,,, | Performed by: PSYCHIATRY & NEUROLOGY

## 2023-11-25 PROCEDURE — 95806 SLEEP STUDY UNATT&RESP EFFT: CPT | Mod: 26,,, | Performed by: PSYCHIATRY & NEUROLOGY

## 2023-11-29 PROBLEM — G47.30 SLEEP APNEA: Status: ACTIVE | Noted: 2023-11-29

## 2023-11-29 PROBLEM — G47.33 OSA (OBSTRUCTIVE SLEEP APNEA): Status: ACTIVE | Noted: 2023-11-29

## 2023-11-30 ENCOUNTER — PATIENT MESSAGE (OUTPATIENT)
Dept: SLEEP MEDICINE | Facility: CLINIC | Age: 48
End: 2023-11-30
Payer: COMMERCIAL

## 2023-11-30 DIAGNOSIS — G47.33 OSA (OBSTRUCTIVE SLEEP APNEA): Primary | ICD-10-CM

## 2023-11-30 NOTE — PROCEDURES
PHYSICIAN INTERPRETATION AND COMMENTS: Findings are consistent with severe, obstructive sleep apnea (INTO) (G47.33),  by overall AHI (apnea hypopnea index). 2. There is elevated concern for sleep related hypoxemia or hypoventilation, based  on finding of large %time SpO2 <90%. This study was technically adequate to allow for interpretation.  CLINICAL HISTORY: 47 year old male presented with: 17 inch neck, BMI of 32.5, an [place] sleepiness score of 4, history of  hypertension, diabetes and symptoms of nocturnal snoring. Based on the clinical history, the patient has a high pre-test  probability of having Severe NITO.  SLEEP STUDY FINDINGS: Patient underwent a 1 night Home Sleep Test and by behavioral criteria, slept for approximately  5.99 hours, with a sleep latency of 5 minutes and a sleep efficiency of 91.8%. Severe sleep disordered breathing (AHI=41) is  noted based on a 4% hypopnea desaturation criteria, predominantly in the supine position (97 events/hour). The patient  slept supine 9.5% of the night based on valid recording time of 5.98 hours and is 2.8 times as likely to have  apneas/hypopneas when supine. The apneas/hypopneas are accompanied by mild oxygen desaturation (percent time  below 90% SpO2: 5.9%, Min SpO2: 76.7%). The average desaturation across all sleep disordered breathing events is 5.6%.  Snoring occurs for 32.5% (30 dB) of the study, 25.8% is very loud. The mean pulse rate is 74.9 BPM, with very frequent pulse  rate variability (134 events with >= 6 BPM increase/decrease per hour).  TREATMENT CONSIDERATIONS: Consider trial of Auto-titrating CPAP 6-20 cm, mask of patient's choice, and heated  humidification. If patient has difficulty with CPAP adherence or ongoing NITO symptoms or despite CPAP adherence, then  consider an in-lab titration sleep study in order to determine optimal fixed CPAP setting. Alternatively consider oral  appliance fitted by a dentist specializing in these devices, or  surgical consultation for uvulopalatopharyngoplasty (UPPP)  for treatment of obstructive sleep apnea. Consider nasal continuous positive airway pressure (CPAP) as the initial  treatment choice for severe obstructive sleep apnea. Consider an attended in-lab CPAP titration study, given the possibility  of co-morbid sleep related hypoventilation. If CPAP trial is unsuccessful, refer to Sleep Clinic for further evaluation and  management.  DISEASE MANAGEMENT CONSIDERATIONS: Definitive treatment for NITO is recommended. Consider Sleep Clinic referral for  NITO management.  Signature:

## 2023-12-04 ENCOUNTER — OFFICE VISIT (OUTPATIENT)
Dept: OPTOMETRY | Facility: CLINIC | Age: 48
End: 2023-12-04
Payer: COMMERCIAL

## 2023-12-04 DIAGNOSIS — E11.9 TYPE 2 DIABETES MELLITUS WITHOUT RETINOPATHY: Primary | ICD-10-CM

## 2023-12-04 PROCEDURE — 1159F PR MEDICATION LIST DOCUMENTED IN MEDICAL RECORD: ICD-10-PCS | Mod: CPTII,S$GLB,, | Performed by: OPTOMETRIST

## 2023-12-04 PROCEDURE — 4010F ACE/ARB THERAPY RXD/TAKEN: CPT | Mod: CPTII,S$GLB,, | Performed by: OPTOMETRIST

## 2023-12-04 PROCEDURE — 99999 PR PBB SHADOW E&M-EST. PATIENT-LVL III: ICD-10-PCS | Mod: PBBFAC,,, | Performed by: OPTOMETRIST

## 2023-12-04 PROCEDURE — 1160F PR REVIEW ALL MEDS BY PRESCRIBER/CLIN PHARMACIST DOCUMENTED: ICD-10-PCS | Mod: CPTII,S$GLB,, | Performed by: OPTOMETRIST

## 2023-12-04 PROCEDURE — 4010F PR ACE/ARB THEARPY RXD/TAKEN: ICD-10-PCS | Mod: CPTII,S$GLB,, | Performed by: OPTOMETRIST

## 2023-12-04 PROCEDURE — 92004 PR EYE EXAM, NEW PATIENT,COMPREHESV: ICD-10-PCS | Mod: S$GLB,,, | Performed by: OPTOMETRIST

## 2023-12-04 PROCEDURE — 1159F MED LIST DOCD IN RCRD: CPT | Mod: CPTII,S$GLB,, | Performed by: OPTOMETRIST

## 2023-12-04 PROCEDURE — 99999 PR PBB SHADOW E&M-EST. PATIENT-LVL III: CPT | Mod: PBBFAC,,, | Performed by: OPTOMETRIST

## 2023-12-04 PROCEDURE — 2023F DILAT RTA XM W/O RTNOPTHY: CPT | Mod: CPTII,S$GLB,, | Performed by: OPTOMETRIST

## 2023-12-04 PROCEDURE — 2023F PR DILATED RETINAL EXAM W/O EVID OF RETINOPATHY: ICD-10-PCS | Mod: CPTII,S$GLB,, | Performed by: OPTOMETRIST

## 2023-12-04 PROCEDURE — 92004 COMPRE OPH EXAM NEW PT 1/>: CPT | Mod: S$GLB,,, | Performed by: OPTOMETRIST

## 2023-12-04 PROCEDURE — 1160F RVW MEDS BY RX/DR IN RCRD: CPT | Mod: CPTII,S$GLB,, | Performed by: OPTOMETRIST

## 2023-12-04 NOTE — PROGRESS NOTES
HPI    Pt here for routine DM exam RUDOLPH 2021     Pt denies any blurred vision. DM and HTN controlled w aid. Pt denies   floaters and flashes. Pt not using eye drops    Hemoglobin A1c       Date                     Value               Ref Range             Status                12/04/2021               6.6 (H)             <5.7 % of tota*       Final                              06/11/2021               6.2 (H)             <5.7 % of tota*       Final                    Last edited by Timi Buckley, OD on 12/4/2023  3:38 PM.            Assessment /Plan     For exam results, see Encounter Report.    Type 2 diabetes mellitus without retinopathy      No diabetic retinopathy, no csme. Return in 1 year for dilated eye exam.